# Patient Record
Sex: FEMALE | Race: WHITE | Employment: FULL TIME | URBAN - METROPOLITAN AREA
[De-identification: names, ages, dates, MRNs, and addresses within clinical notes are randomized per-mention and may not be internally consistent; named-entity substitution may affect disease eponyms.]

---

## 2021-07-30 ENCOUNTER — HOSPITAL ENCOUNTER (EMERGENCY)
Age: 46
Discharge: HOME | End: 2021-07-30
Attending: EMERGENCY MEDICINE
Payer: COMMERCIAL

## 2021-07-30 VITALS
DIASTOLIC BLOOD PRESSURE: 69 MMHG | SYSTOLIC BLOOD PRESSURE: 100 MMHG | OXYGEN SATURATION: 100 % | WEIGHT: 180.12 LBS | TEMPERATURE: 98.1 F | RESPIRATION RATE: 16 BRPM | HEART RATE: 74 BPM

## 2021-07-30 DIAGNOSIS — R55 VASOVAGAL SYNCOPE: Primary | ICD-10-CM

## 2021-07-30 LAB
ATRIAL RATE: 68 BPM
CALCULATED P AXIS, ECG09: 42 DEGREES
CALCULATED R AXIS, ECG10: 33 DEGREES
CALCULATED T AXIS, ECG11: 41 DEGREES
DIAGNOSIS, 93000: NORMAL
P-R INTERVAL, ECG05: 164 MS
Q-T INTERVAL, ECG07: 418 MS
QRS DURATION, ECG06: 84 MS
QTC CALCULATION (BEZET), ECG08: 444 MS
VENTRICULAR RATE, ECG03: 68 BPM

## 2021-07-30 PROCEDURE — 93005 ELECTROCARDIOGRAM TRACING: CPT

## 2021-07-30 PROCEDURE — 99284 EMERGENCY DEPT VISIT MOD MDM: CPT

## 2021-07-30 NOTE — ED TRIAGE NOTES
Triage Note: Patient arrives to ER via EMS for syncope. Patient states she had a syncopal episode after donating blood today. Patient states she started feeling unwell towards the end of the transfusion. Once outside she started feeling worse, sat down on a bench and had that's when she passed out. States \"it might have been the heat, I don't handle heat very well. \"    Per EMS, patient was initially hypotensive but then BP improved en route, had an episode of vomiting and given 4 mg PO Zofran. Prior to their arrival patient had an episode of diarrhea.  BGL normal.

## 2021-07-30 NOTE — ED NOTES
Pt ambulatory >75 feet without dizziness or any other complaints at this time. Dr. Crystal San made aware.

## 2021-07-30 NOTE — ED NOTES
Pt ambulatory out of ED with discharge instructions in hand given by Dr. Rickey Ba; pt verbalized understanding of discharge paperwork and time allotted for questions. VSS. Pt alert and oriented. Pt accompanied by spouse.

## 2021-08-01 NOTE — ED PROVIDER NOTES
The history is provided by the patient. Syncope   This is a new problem. The current episode started less than 1 hour ago. The problem occurs rarely. The problem has been resolved. She lost consciousness for a period of less than one minute. Associated with: donating blood, having a meal which made her queasy, and walking into the heat outside. Associated symptoms include malaise/fatigue and light-headedness. Pertinent negatives include no chest pain, no fever, no bowel incontinence and no bladder incontinence. She has tried nothing for the symptoms. History reviewed. No pertinent past medical history. Past Surgical History:   Procedure Laterality Date    HX GYN      uterine ablation    HX HERNIA REPAIR      HX SEPTOPLASTY      HX TONSILLECTOMY           History reviewed. No pertinent family history. Social History     Socioeconomic History    Marital status:      Spouse name: Not on file    Number of children: Not on file    Years of education: Not on file    Highest education level: Not on file   Occupational History    Not on file   Tobacco Use    Smoking status: Never Smoker    Smokeless tobacco: Never Used   Substance and Sexual Activity    Alcohol use: Never    Drug use: Not on file    Sexual activity: Not on file   Other Topics Concern    Not on file   Social History Narrative    Not on file     Social Determinants of Health     Financial Resource Strain:     Difficulty of Paying Living Expenses:    Food Insecurity:     Worried About Running Out of Food in the Last Year:     [address] in the Last Year:    Transportation Needs:     Lack of Transportation (Medical):      Lack of Transportation (Non-Medical):    Physical Activity:     Days of Exercise per Week:     Minutes of Exercise per Session:    Stress:     Feeling of Stress :    Social Connections:     Frequency of Communication with Friends and Family:     Frequency of Social Gatherings with Friends and Family:     Attends [employer] Services:     Active Member of Clubs or Organizations:     Attends Club or Organization Meetings:     Marital Status:    Intimate Partner Violence:     Fear of Current or Ex-Partner:     Emotionally Abused:     Physically Abused:     Sexually Abused: ALLERGIES: Patient has no known allergies. Review of Systems   Constitutional: Positive for malaise/fatigue. Negative for fever. Cardiovascular: Positive for syncope. Negative for chest pain. Gastrointestinal: Negative for bowel incontinence. Genitourinary: Negative for bladder incontinence. Neurological: Positive for light-headedness. All other systems reviewed and are negative. Vitals:    07/30/21 1600 07/30/21 1615 07/30/21 1630 07/30/21 1641   BP:    100/69   Pulse:    74   Resp:    16   Temp:    98.1 °F (36.7 °C)   SpO2: 97% 98% 99% 100%   Weight:                Physical Exam  Vitals and nursing note reviewed. Constitutional:       General: She is not in acute distress. Appearance: Normal appearance. She is well-developed. HENT:      Head: Normocephalic and atraumatic. Mouth/Throat:      Mouth: Mucous membranes are moist.   Eyes:      Conjunctiva/sclera: Conjunctivae normal.   Cardiovascular:      Rate and Rhythm: Normal rate and regular rhythm. Heart sounds: Normal heart sounds. Pulmonary:      Effort: Pulmonary effort is normal. No respiratory distress. Breath sounds: Normal breath sounds. Abdominal:      General: There is no distension. Palpations: Abdomen is soft. Tenderness: There is no abdominal tenderness. There is no guarding. Musculoskeletal:         General: No deformity. Normal range of motion. Cervical back: Neck supple. Skin:     General: Skin is warm and dry. Neurological:      General: No focal deficit present. Mental Status: She is alert. Cranial Nerves: No cranial nerve deficit.    Psychiatric:         Mood and Affect: Mood normal.         Behavior: Behavior normal.          [facility]  ED Course as of Aug 01 0346   Fri Jul 30, 2021   1639 EKG 1528: Rate 68, Normal sinus rhythm, No ST segment or T wave abnormalities. Normal EKG. [DK]      ED Course User Index  [DK] Christine Alegre MD     78-year-old female presents with a syncopal episode that occurred just prior to arrival.  She had typical signs and symptoms of vasovagal syncope that occurred after she donated blood. She had a meal earlier today which made her stomach upset slightly and she had walked out into the heat and laid on a bench when she became lightheaded and sweaty and lost consciousness briefly before spontaneously recovering. She is well-appearing here. EKG reviewed by me is normal sinus rhythm and rate, without evidence of ST or T wave changes to suggest myocardial ischemia, no delta wave, no brugada, no prolonged QT to suggest arrhythmogenicity. Further work-up not emergently indicated, discussed supportive care measures to continue recovery and avoidance of triggers. Discussed eating something and resting after blood donation in the future until she can equilibrate. Plan to follow up with PCP as needed and return precautions discussed for worsening or new concerning symptoms.      Procedures